# Patient Record
Sex: FEMALE | Race: WHITE | NOT HISPANIC OR LATINO | Employment: OTHER
[De-identification: names, ages, dates, MRNs, and addresses within clinical notes are randomized per-mention and may not be internally consistent; named-entity substitution may affect disease eponyms.]

---

## 2024-05-19 ENCOUNTER — HEALTH MAINTENANCE LETTER (OUTPATIENT)
Age: 64
End: 2024-05-19

## 2024-05-23 ENCOUNTER — OFFICE VISIT (OUTPATIENT)
Dept: OBGYN | Facility: CLINIC | Age: 64
End: 2024-05-23

## 2024-05-23 VITALS
SYSTOLIC BLOOD PRESSURE: 126 MMHG | HEART RATE: 81 BPM | HEIGHT: 65 IN | BODY MASS INDEX: 24.66 KG/M2 | TEMPERATURE: 97.9 F | RESPIRATION RATE: 16 BRPM | WEIGHT: 148 LBS | DIASTOLIC BLOOD PRESSURE: 77 MMHG

## 2024-05-23 DIAGNOSIS — N81.11 CYSTOCELE, MIDLINE: Primary | ICD-10-CM

## 2024-05-23 DIAGNOSIS — Z12.4 CERVICAL CANCER SCREENING: ICD-10-CM

## 2024-05-23 DIAGNOSIS — N81.6 RECTOCELE: ICD-10-CM

## 2024-05-23 PROCEDURE — 87624 HPV HI-RISK TYP POOLED RSLT: CPT | Performed by: OBSTETRICS & GYNECOLOGY

## 2024-05-23 PROCEDURE — G0145 SCR C/V CYTO,THINLAYER,RESCR: HCPCS | Performed by: OBSTETRICS & GYNECOLOGY

## 2024-05-23 PROCEDURE — 99459 PELVIC EXAMINATION: CPT | Performed by: OBSTETRICS & GYNECOLOGY

## 2024-05-23 PROCEDURE — 99202 OFFICE O/P NEW SF 15 MIN: CPT | Performed by: OBSTETRICS & GYNECOLOGY

## 2024-05-23 RX ORDER — ASPIRIN 81 MG/1
81 TABLET, CHEWABLE ORAL
COMMUNITY
Start: 2024-05-10

## 2024-05-23 NOTE — PROGRESS NOTES
Chief Complaint   Patient presents with    Consult     Cystocele/ rectocle         HPI:  Sveta Maloney, 63 year old,  presents with complaints of prolapse.  A few weeks ago she was in the shower and she felt 2 small balls in the vaginal opening.  She denies any bladder symptoms except some frequency.  She has no leakage.  She might have a little bit of leakage with sneeze but not cough.  She is not wearing pads.  She denies any fecal or incontinence of flatus.  She did feel some pressure that day in the shower but otherwise she has not felt much and it is not bothersome.    Surgical History    Surgical History  Surgery Date Site/Laterality Comments   VAGINAL DELIVERY         TUBAL LIGATION 2005 - 2005   Tubal Ligation/Transection     SECTION     x 2    COLONOSCOPY 2016         Medical History    Medical History  Medical History Date Comments   Iron deficiency anemia secondary to inadequate dietary iron intake       Raynaud's disease           Social History     Socioeconomic History    Marital status:      Spouse name: Not on file    Number of children: Not on file    Years of education: Not on file    Highest education level: Not on file   Occupational History    Not on file   Tobacco Use    Smoking status: Never     Passive exposure: Past    Smokeless tobacco: Never   Vaping Use    Vaping status: Never Used   Substance and Sexual Activity    Alcohol use: Yes     Comment: occ    Drug use: Never    Sexual activity: Yes     Partners: Male     Birth control/protection: Female Surgical   Other Topics Concern    Not on file   Social History Narrative    Not on file     Social Determinants of Health     Financial Resource Strain: Not on file   Food Insecurity: Not on file   Transportation Needs: Not on file   Physical Activity: Not on file   Stress: Not on file   Social Connections: Unknown (2022)    Received from [facility] [employer] [employer] [facility]    Social  "Connections     Frequency of Communication with Friends and Family: Not on file   Interpersonal Safety: Not on file   Housing Stability: Not on file     History reviewed. No pertinent family history.     Current Outpatient Medications   Medication Sig Dispense Refill    aspirin (ASA) 81 MG chewable tablet Take 81 mg by mouth          Allergies:   No Known Allergies     ROS:  See HPI      Physical Exam:  /77 (BP Location: Left arm, Patient Position: Chair, Cuff Size: Adult Regular)   Pulse 81   Temp 97.9  F (36.6  C) (Tympanic)   Resp 16   Ht 1.651 m (5' 5\")   Wt 67.1 kg (148 lb)   BMI 24.63 kg/m       Appearance: well developed, well nourished, no acute distress  Head Exam normocephalic, no lesions or deformities  Abdomen: soft, non-tender, no masses, no organomegaly, no hernia,  Skin: no lesions  Extremities: no edema  Psych: orientated x3    Genitourinary Exam  Vulva: normal, no lesions  Urethral meatus: normal size and location, no lesions or discharge  Urethra: no tenderness or masses, minimal hypermobility  Bladder: no fullness or tenderness  Vagina: grade 2 cystocele and grade 3 rectocele  Cervix: normal appearance, no lesions, no discharge, no cervical motion tenderness  Uterus: normal position, mobile, non-tender, size 6 weeks  Adnexa: no palpable masses bilaterally    Assessment/Plan:  Encounter Diagnoses   Name Primary?    Cystocele, midline Yes    Rectocele     Cervical cancer screening      She does have a cystocele, rectocele and small perineal defect.   Her uterus has minimal descent with valsalva and she has no symptoms of stress incontinence, but does have a little hypermobility on exam.    Discussed prolapse and reviewed pictures.  Discussed options including no treatment, Poise Impressa, splinting, pessaries and surgery with and without mesh.  Discussed risks and benefits of each.  After discussion, she decided it is not bothersome at this time and she will return if it becomes " bothersome.  We discussed prevention of worsening including avoiding heavy lifting, chronic cough or constipation.     Pamphlets were given and she will return if it becomes bothersome.    20 minutes spent on 5/23/2024 doing chart review, patient visit, and documentation.      Nohemi Hanna MD on 5/23/2024 at 2:58 PM

## 2024-05-23 NOTE — NURSING NOTE
"Initial /77 (BP Location: Left arm, Patient Position: Chair, Cuff Size: Adult Regular)   Pulse 81   Temp 97.9  F (36.6  C) (Tympanic)   Resp 16   Ht 1.651 m (5' 5\")   Wt 67.1 kg (148 lb)   BMI 24.63 kg/m   Estimated body mass index is 24.63 kg/m  as calculated from the following:    Height as of this encounter: 1.651 m (5' 5\").    Weight as of this encounter: 67.1 kg (148 lb). .    Natalie Lee, OFELIA    "

## 2024-05-24 LAB
HPV HR 12 DNA CVX QL NAA+PROBE: NEGATIVE
HPV16 DNA CVX QL NAA+PROBE: NEGATIVE
HPV18 DNA CVX QL NAA+PROBE: NEGATIVE
HUMAN PAPILLOMA VIRUS FINAL DIAGNOSIS: NORMAL

## 2024-05-29 LAB
BKR LAB AP GYN ADEQUACY: NORMAL
BKR LAB AP GYN INTERPRETATION: NORMAL
BKR LAB AP PREVIOUS ABNORMAL: NORMAL
PATH REPORT.COMMENTS IMP SPEC: NORMAL
PATH REPORT.COMMENTS IMP SPEC: NORMAL
PATH REPORT.RELEVANT HX SPEC: NORMAL

## 2025-04-02 ENCOUNTER — PATIENT OUTREACH (OUTPATIENT)
Dept: OBGYN | Facility: CLINIC | Age: 65
End: 2025-04-02

## 2025-04-02 NOTE — TELEPHONE ENCOUNTER
"Panel Management Review        Health Maintenance List    Health Maintenance   Topic Date Due    ADVANCE CARE PLANNING  Never done    DIABETES SCREENING  Never done    YEARLY PREVENTIVE VISIT  Never done    HIV SCREENING  Never done    HEPATITIS C SCREENING  Never done    LIPID  Never done    Pneumococcal Vaccine: 50+ Years (1 of 1 - PCV) Never done    ZOSTER IMMUNIZATION (1 of 2) Never done    MAMMO SCREENING  03/30/2017    COLORECTAL CANCER SCREENING  02/04/2020    INFLUENZA VACCINE (1) Never done    COVID-19 Vaccine (1 - 2024-25 season) Never done    PHQ-2 (once per calendar year)  01/01/2025    PAP FOLLOW-UP  05/23/2029    HPV FOLLOW-UP  05/23/2029    DTAP/TDAP/TD IMMUNIZATION (3 - Td or Tdap) 09/07/2032    RSV VACCINE (1 - 1-dose 75+ series) 08/12/2036    HPV IMMUNIZATION  Aged Out    MENINGITIS IMMUNIZATION  Aged Out    PAP  Discontinued       Composite cancer screening  Chart review shows that this patient is due/due soon for the following Mammogram and Colonoscopy  No results found for: \"PAP\"  No past surgical history on file.    Is hysterectomy listed in surgical history? No   Is mastectomy listed in surgical history? No     Summary:    Patient is due/failing the following:   Mammogram and Colonoscopy    Action needed: Patient needs office visit for mammogram, colonoscopy.    Type of outreach:  Sent [employer] message.      Staff Signature:  Natalie Lee CMA      "

## 2025-04-02 NOTE — LETTER
2025      Sveta Maloney  [address] [place] [id number]              Dear Sveta,      To ensure we are providing the best quality care, we have reviewed your chart and see that you are due for:    Breast Cancer Screening:    Please call [facility]  at [phone] to schedule a Mammogram.    Colon Cancer Screening:    If you have received a referral to schedule a Colonoscopy or choose to do a Colonoscopy instead of the FIT/ Cologuard test, please call [phone] to schedule.    If you have received a FIT test kit from a prior office visit, please check the expiration date. If , please get a new kit from the Lab/ Clinic. If not , please complete the test and mail in as soon as you are able.    If you would prefer to complete a Cologuard test, please reach out to your provider to see if this is an option for you.    You may call Dept: [phone] if you have any questions. If you have completed the tests outside of [facility], please have the results forwarded to our office Fax # [phone]. We will update the chart for your primary Physician to review before your next annual physical.      Sincerely,      Nohemi Hanna MD

## 2025-06-08 ENCOUNTER — HEALTH MAINTENANCE LETTER (OUTPATIENT)
Age: 65
End: 2025-06-08